# Patient Record
Sex: FEMALE | Race: WHITE | NOT HISPANIC OR LATINO | Employment: OTHER | URBAN - METROPOLITAN AREA
[De-identification: names, ages, dates, MRNs, and addresses within clinical notes are randomized per-mention and may not be internally consistent; named-entity substitution may affect disease eponyms.]

---

## 2018-11-10 ENCOUNTER — HOSPITAL ENCOUNTER (OUTPATIENT)
Facility: MEDICAL CENTER | Age: 28
End: 2018-11-12
Attending: EMERGENCY MEDICINE | Admitting: HOSPITALIST
Payer: MEDICARE

## 2018-11-10 DIAGNOSIS — Q79.60 EHLERS-DANLOS SYNDROME: ICD-10-CM

## 2018-11-10 DIAGNOSIS — H53.9 VISUAL DISTURBANCE: ICD-10-CM

## 2018-11-10 PROBLEM — H53.8 BLURRED VISION, BILATERAL: Status: ACTIVE | Noted: 2018-11-10

## 2018-11-10 PROBLEM — R11.2 NAUSEA AND VOMITING: Status: ACTIVE | Noted: 2018-11-10

## 2018-11-10 PROBLEM — F31.9 BIPOLAR 1 DISORDER (HCC): Status: ACTIVE | Noted: 2018-11-10

## 2018-11-10 PROBLEM — E03.9 HYPOTHYROIDISM: Status: ACTIVE | Noted: 2018-11-10

## 2018-11-10 PROBLEM — G43.909 MIGRAINES: Status: ACTIVE | Noted: 2018-11-10

## 2018-11-10 PROCEDURE — G0378 HOSPITAL OBSERVATION PER HR: HCPCS

## 2018-11-10 PROCEDURE — 99220 PR INITIAL OBSERVATION CARE,LEVL III: CPT | Performed by: HOSPITALIST

## 2018-11-10 PROCEDURE — 99285 EMERGENCY DEPT VISIT HI MDM: CPT

## 2018-11-10 PROCEDURE — 700102 HCHG RX REV CODE 250 W/ 637 OVERRIDE(OP): Performed by: HOSPITALIST

## 2018-11-10 PROCEDURE — A9270 NON-COVERED ITEM OR SERVICE: HCPCS | Performed by: HOSPITALIST

## 2018-11-10 PROCEDURE — 306588 SLEEVE,VASO CALF MED: Performed by: HOSPITALIST

## 2018-11-10 RX ORDER — FAMOTIDINE 20 MG/1
20 TABLET, FILM COATED ORAL DAILY
Status: DISCONTINUED | OUTPATIENT
Start: 2018-11-11 | End: 2018-11-12 | Stop reason: HOSPADM

## 2018-11-10 RX ORDER — PRAMIPEXOLE DIHYDROCHLORIDE 0.5 MG/1
0.5 TABLET ORAL EVERY EVENING
COMMUNITY

## 2018-11-10 RX ORDER — PROMETHAZINE HYDROCHLORIDE 25 MG/1
12.5-25 TABLET ORAL EVERY 4 HOURS PRN
Status: DISCONTINUED | OUTPATIENT
Start: 2018-11-10 | End: 2018-11-12 | Stop reason: HOSPADM

## 2018-11-10 RX ORDER — OMEPRAZOLE 20 MG/1
20 CAPSULE, DELAYED RELEASE ORAL EVERY MORNING
Status: DISCONTINUED | OUTPATIENT
Start: 2018-11-11 | End: 2018-11-12 | Stop reason: HOSPADM

## 2018-11-10 RX ORDER — NORETHINDRONE ACETATE AND ETHINYL ESTRADIOL .03; 1.5 MG/1; MG/1
1 TABLET ORAL EVERY MORNING
COMMUNITY

## 2018-11-10 RX ORDER — ONDANSETRON 4 MG/1
4 TABLET, ORALLY DISINTEGRATING ORAL EVERY 6 HOURS PRN
COMMUNITY

## 2018-11-10 RX ORDER — BUTALBITAL, ACETAMINOPHEN AND CAFFEINE 50; 325; 40 MG/1; MG/1; MG/1
1 TABLET ORAL EVERY 4 HOURS PRN
COMMUNITY

## 2018-11-10 RX ORDER — ALBUTEROL SULFATE 2.5 MG/3ML
2.5 SOLUTION RESPIRATORY (INHALATION) EVERY 4 HOURS PRN
COMMUNITY

## 2018-11-10 RX ORDER — CARBAMAZEPINE 200 MG/1
400 TABLET ORAL 2 TIMES DAILY
Status: DISCONTINUED | OUTPATIENT
Start: 2018-11-10 | End: 2018-11-12 | Stop reason: HOSPADM

## 2018-11-10 RX ORDER — ALPRAZOLAM 0.5 MG/1
0.5 TABLET ORAL 2 TIMES DAILY
Status: DISCONTINUED | OUTPATIENT
Start: 2018-11-10 | End: 2018-11-12 | Stop reason: HOSPADM

## 2018-11-10 RX ORDER — ONDANSETRON 4 MG/1
4 TABLET, ORALLY DISINTEGRATING ORAL EVERY 4 HOURS PRN
Status: DISCONTINUED | OUTPATIENT
Start: 2018-11-10 | End: 2018-11-12 | Stop reason: HOSPADM

## 2018-11-10 RX ORDER — TRAMADOL HYDROCHLORIDE 50 MG/1
50 TABLET ORAL EVERY 6 HOURS PRN
Status: DISCONTINUED | OUTPATIENT
Start: 2018-11-10 | End: 2018-11-12 | Stop reason: HOSPADM

## 2018-11-10 RX ORDER — OMEPRAZOLE 20 MG/1
20 CAPSULE, DELAYED RELEASE ORAL EVERY MORNING
COMMUNITY

## 2018-11-10 RX ORDER — TRAMADOL HYDROCHLORIDE 50 MG/1
50 TABLET ORAL EVERY 6 HOURS PRN
COMMUNITY

## 2018-11-10 RX ORDER — BISACODYL 10 MG
10 SUPPOSITORY, RECTAL RECTAL
Status: DISCONTINUED | OUTPATIENT
Start: 2018-11-10 | End: 2018-11-12 | Stop reason: HOSPADM

## 2018-11-10 RX ORDER — ALPRAZOLAM 0.5 MG/1
0.5 TABLET ORAL 2 TIMES DAILY
COMMUNITY

## 2018-11-10 RX ORDER — LEVOTHYROXINE SODIUM 0.12 MG/1
125 TABLET ORAL
Status: DISCONTINUED | OUTPATIENT
Start: 2018-11-11 | End: 2018-11-12 | Stop reason: HOSPADM

## 2018-11-10 RX ORDER — BUTALBITAL, ACETAMINOPHEN AND CAFFEINE 50; 325; 40 MG/1; MG/1; MG/1
1 TABLET ORAL EVERY 4 HOURS PRN
Status: DISCONTINUED | OUTPATIENT
Start: 2018-11-10 | End: 2018-11-12 | Stop reason: HOSPADM

## 2018-11-10 RX ORDER — PRAMIPEXOLE DIHYDROCHLORIDE 0.5 MG/1
0.5 TABLET ORAL EVERY EVENING
Status: DISCONTINUED | OUTPATIENT
Start: 2018-11-10 | End: 2018-11-12 | Stop reason: HOSPADM

## 2018-11-10 RX ORDER — LEVOTHYROXINE SODIUM 0.12 MG/1
125 TABLET ORAL
COMMUNITY

## 2018-11-10 RX ORDER — TIZANIDINE 4 MG/1
4 TABLET ORAL EVERY 6 HOURS PRN
Status: DISCONTINUED | OUTPATIENT
Start: 2018-11-10 | End: 2018-11-12 | Stop reason: HOSPADM

## 2018-11-10 RX ORDER — FENTANYL 50 UG/1
1 PATCH TRANSDERMAL
COMMUNITY

## 2018-11-10 RX ORDER — MELOXICAM 15 MG/1
15 TABLET ORAL
COMMUNITY

## 2018-11-10 RX ORDER — POLYETHYLENE GLYCOL 3350 17 G/17G
1 POWDER, FOR SOLUTION ORAL
Status: DISCONTINUED | OUTPATIENT
Start: 2018-11-10 | End: 2018-11-12 | Stop reason: HOSPADM

## 2018-11-10 RX ORDER — VENLAFAXINE HYDROCHLORIDE 75 MG/1
150 CAPSULE, EXTENDED RELEASE ORAL EVERY EVENING
Status: DISCONTINUED | OUTPATIENT
Start: 2018-11-10 | End: 2018-11-12 | Stop reason: HOSPADM

## 2018-11-10 RX ORDER — EPINEPHRINE 0.3 MG/.3ML
0.3 INJECTION SUBCUTANEOUS ONCE
COMMUNITY

## 2018-11-10 RX ORDER — FLUOCINONIDE 0.5 MG/G
60 OINTMENT TOPICAL EVERY EVENING
COMMUNITY

## 2018-11-10 RX ORDER — PREGABALIN 75 MG/1
75 CAPSULE ORAL
Status: DISCONTINUED | OUTPATIENT
Start: 2018-11-10 | End: 2018-11-12 | Stop reason: HOSPADM

## 2018-11-10 RX ORDER — VENLAFAXINE HYDROCHLORIDE 150 MG/1
150 CAPSULE, EXTENDED RELEASE ORAL EVERY EVENING
COMMUNITY

## 2018-11-10 RX ORDER — TRIAMCINOLONE ACETONIDE 1 MG/G
1 CREAM TOPICAL 2 TIMES DAILY PRN
COMMUNITY

## 2018-11-10 RX ORDER — PREGABALIN 75 MG/1
75 CAPSULE ORAL
COMMUNITY

## 2018-11-10 RX ORDER — RANITIDINE 300 MG/1
300 TABLET ORAL SEE ADMIN INSTRUCTIONS
COMMUNITY

## 2018-11-10 RX ORDER — PROMETHAZINE HYDROCHLORIDE 25 MG/1
12.5-25 SUPPOSITORY RECTAL EVERY 4 HOURS PRN
Status: DISCONTINUED | OUTPATIENT
Start: 2018-11-10 | End: 2018-11-12 | Stop reason: HOSPADM

## 2018-11-10 RX ORDER — CETIRIZINE HYDROCHLORIDE 10 MG/1
10 TABLET ORAL
COMMUNITY

## 2018-11-10 RX ORDER — ALBUTEROL SULFATE 90 UG/1
2 AEROSOL, METERED RESPIRATORY (INHALATION) EVERY 6 HOURS PRN
COMMUNITY

## 2018-11-10 RX ORDER — TIZANIDINE 4 MG/1
4 TABLET ORAL EVERY 6 HOURS PRN
COMMUNITY

## 2018-11-10 RX ORDER — ONDANSETRON 2 MG/ML
4 INJECTION INTRAMUSCULAR; INTRAVENOUS EVERY 4 HOURS PRN
Status: DISCONTINUED | OUTPATIENT
Start: 2018-11-10 | End: 2018-11-12 | Stop reason: HOSPADM

## 2018-11-10 RX ORDER — DIPHENHYDRAMINE HCL 25 MG
25-50 TABLET ORAL
COMMUNITY

## 2018-11-10 RX ORDER — CARBAMAZEPINE 200 MG/1
400 TABLET ORAL 2 TIMES DAILY
COMMUNITY

## 2018-11-10 RX ORDER — AMOXICILLIN 250 MG
2 CAPSULE ORAL 2 TIMES DAILY
Status: DISCONTINUED | OUTPATIENT
Start: 2018-11-10 | End: 2018-11-12 | Stop reason: HOSPADM

## 2018-11-10 RX ADMIN — PRAMIPEXOLE DIHYDROCHLORIDE 0.5 MG: 0.5 TABLET ORAL at 22:08

## 2018-11-10 RX ADMIN — CARBAMAZEPINE 400 MG: 200 TABLET ORAL at 22:07

## 2018-11-10 RX ADMIN — ALPRAZOLAM 0.5 MG: 0.5 TABLET ORAL at 22:06

## 2018-11-10 RX ADMIN — STANDARDIZED SENNA CONCENTRATE AND DOCUSATE SODIUM 2 TABLET: 8.6; 5 TABLET, FILM COATED ORAL at 22:08

## 2018-11-10 RX ADMIN — VENLAFAXINE HYDROCHLORIDE 150 MG: 75 CAPSULE, EXTENDED RELEASE ORAL at 22:08

## 2018-11-10 ASSESSMENT — PAIN SCALES - GENERAL
PAINLEVEL_OUTOF10: 3
PAINLEVEL_OUTOF10: 3
PAINLEVEL_OUTOF10: 0
PAINLEVEL_OUTOF10: 3

## 2018-11-10 ASSESSMENT — ENCOUNTER SYMPTOMS
FEVER: 0
HEARTBURN: 0
WEAKNESS: 0
FOCAL WEAKNESS: 0
HALLUCINATIONS: 0
SHORTNESS OF BREATH: 0
HEMOPTYSIS: 0
DIZZINESS: 1
SPEECH CHANGE: 0
DOUBLE VISION: 0
BLURRED VISION: 1
EYE DISCHARGE: 0
DEPRESSION: 0
ABDOMINAL PAIN: 0
MYALGIAS: 0
PALPITATIONS: 0
BRUISES/BLEEDS EASILY: 0
NAUSEA: 1
FLANK PAIN: 0
CHILLS: 0
HEADACHES: 1
SENSORY CHANGE: 0
COUGH: 0
VOMITING: 1

## 2018-11-10 ASSESSMENT — COPD QUESTIONNAIRES
DURING THE PAST 4 WEEKS HOW MUCH DID YOU FEEL SHORT OF BREATH: SOME OF THE TIME
IN THE PAST 12 MONTHS DO YOU DO LESS THAN YOU USED TO BECAUSE OF YOUR BREATHING PROBLEMS: DISAGREE/UNSURE
HAVE YOU SMOKED AT LEAST 100 CIGARETTES IN YOUR ENTIRE LIFE: NO/DON'T KNOW
DO YOU EVER COUGH UP ANY MUCUS OR PHLEGM?: NO/ONLY WITH OCCASIONAL COLDS OR INFECTIONS
COPD SCREENING SCORE: 1

## 2018-11-10 ASSESSMENT — PATIENT HEALTH QUESTIONNAIRE - PHQ9
SUM OF ALL RESPONSES TO PHQ9 QUESTIONS 1 AND 2: 0
1. LITTLE INTEREST OR PLEASURE IN DOING THINGS: NOT AT ALL
2. FEELING DOWN, DEPRESSED, IRRITABLE, OR HOPELESS: NOT AT ALL

## 2018-11-10 ASSESSMENT — LIFESTYLE VARIABLES
EVER_SMOKED: NEVER
SUBSTANCE_ABUSE: 0
ALCOHOL_USE: NO

## 2018-11-11 ENCOUNTER — APPOINTMENT (OUTPATIENT)
Dept: RADIOLOGY | Facility: MEDICAL CENTER | Age: 28
End: 2018-11-11
Attending: HOSPITALIST
Payer: MEDICARE

## 2018-11-11 LAB
ALBUMIN SERPL BCP-MCNC: 3.5 G/DL (ref 3.2–4.9)
ALBUMIN/GLOB SERPL: 1.2 G/DL
ALP SERPL-CCNC: 69 U/L (ref 30–99)
ALT SERPL-CCNC: 12 U/L (ref 2–50)
ANION GAP SERPL CALC-SCNC: 7 MMOL/L (ref 0–11.9)
AST SERPL-CCNC: 14 U/L (ref 12–45)
BASOPHILS # BLD AUTO: 0.4 % (ref 0–1.8)
BASOPHILS # BLD: 0.02 K/UL (ref 0–0.12)
BILIRUB SERPL-MCNC: 0.3 MG/DL (ref 0.1–1.5)
BUN SERPL-MCNC: 11 MG/DL (ref 8–22)
CALCIUM SERPL-MCNC: 9.1 MG/DL (ref 8.5–10.5)
CHLORIDE SERPL-SCNC: 105 MMOL/L (ref 96–112)
CHOLEST SERPL-MCNC: 164 MG/DL (ref 100–199)
CO2 SERPL-SCNC: 27 MMOL/L (ref 20–33)
CREAT SERPL-MCNC: 0.54 MG/DL (ref 0.5–1.4)
EOSINOPHIL # BLD AUTO: 0.12 K/UL (ref 0–0.51)
EOSINOPHIL NFR BLD: 2.5 % (ref 0–6.9)
ERYTHROCYTE [DISTWIDTH] IN BLOOD BY AUTOMATED COUNT: 41.3 FL (ref 35.9–50)
EST. AVERAGE GLUCOSE BLD GHB EST-MCNC: 123 MG/DL
GLOBULIN SER CALC-MCNC: 3 G/DL (ref 1.9–3.5)
GLUCOSE SERPL-MCNC: 100 MG/DL (ref 65–99)
HBA1C MFR BLD: 5.9 % (ref 0–5.6)
HCT VFR BLD AUTO: 37.8 % (ref 37–47)
HDLC SERPL-MCNC: 48 MG/DL
HGB BLD-MCNC: 12.2 G/DL (ref 12–16)
IMM GRANULOCYTES # BLD AUTO: 0.01 K/UL (ref 0–0.11)
IMM GRANULOCYTES NFR BLD AUTO: 0.2 % (ref 0–0.9)
LDLC SERPL CALC-MCNC: 95 MG/DL
LYMPHOCYTES # BLD AUTO: 2.15 K/UL (ref 1–4.8)
LYMPHOCYTES NFR BLD: 44.2 % (ref 22–41)
MCH RBC QN AUTO: 29.1 PG (ref 27–33)
MCHC RBC AUTO-ENTMCNC: 32.3 G/DL (ref 33.6–35)
MCV RBC AUTO: 90.2 FL (ref 81.4–97.8)
MONOCYTES # BLD AUTO: 0.45 K/UL (ref 0–0.85)
MONOCYTES NFR BLD AUTO: 9.3 % (ref 0–13.4)
NEUTROPHILS # BLD AUTO: 2.11 K/UL (ref 2–7.15)
NEUTROPHILS NFR BLD: 43.4 % (ref 44–72)
NRBC # BLD AUTO: 0 K/UL
NRBC BLD-RTO: 0 /100 WBC
PLATELET # BLD AUTO: 240 K/UL (ref 164–446)
PMV BLD AUTO: 10.8 FL (ref 9–12.9)
POTASSIUM SERPL-SCNC: 3.7 MMOL/L (ref 3.6–5.5)
PROT SERPL-MCNC: 6.5 G/DL (ref 6–8.2)
RBC # BLD AUTO: 4.19 M/UL (ref 4.2–5.4)
SODIUM SERPL-SCNC: 139 MMOL/L (ref 135–145)
TRIGL SERPL-MCNC: 106 MG/DL (ref 0–149)
TSH SERPL DL<=0.005 MIU/L-ACNC: 0.51 UIU/ML (ref 0.38–5.33)
WBC # BLD AUTO: 4.9 K/UL (ref 4.8–10.8)

## 2018-11-11 PROCEDURE — 700102 HCHG RX REV CODE 250 W/ 637 OVERRIDE(OP): Performed by: HOSPITALIST

## 2018-11-11 PROCEDURE — 84443 ASSAY THYROID STIM HORMONE: CPT

## 2018-11-11 PROCEDURE — A9270 NON-COVERED ITEM OR SERVICE: HCPCS | Performed by: HOSPITALIST

## 2018-11-11 PROCEDURE — 70553 MRI BRAIN STEM W/O & W/DYE: CPT

## 2018-11-11 PROCEDURE — A9585 GADOBUTROL INJECTION: HCPCS | Performed by: INTERNAL MEDICINE

## 2018-11-11 PROCEDURE — 80061 LIPID PANEL: CPT

## 2018-11-11 PROCEDURE — 80053 COMPREHEN METABOLIC PANEL: CPT

## 2018-11-11 PROCEDURE — G0378 HOSPITAL OBSERVATION PER HR: HCPCS

## 2018-11-11 PROCEDURE — 99225 PR SUBSEQUENT OBSERVATION CARE,LEVEL II: CPT | Performed by: INTERNAL MEDICINE

## 2018-11-11 PROCEDURE — 85025 COMPLETE CBC W/AUTO DIFF WBC: CPT

## 2018-11-11 PROCEDURE — 83036 HEMOGLOBIN GLYCOSYLATED A1C: CPT

## 2018-11-11 PROCEDURE — 70543 MRI ORBT/FAC/NCK W/O &W/DYE: CPT

## 2018-11-11 PROCEDURE — 700117 HCHG RX CONTRAST REV CODE 255: Performed by: INTERNAL MEDICINE

## 2018-11-11 RX ORDER — GADOBUTROL 604.72 MG/ML
9 INJECTION INTRAVENOUS ONCE
Status: COMPLETED | OUTPATIENT
Start: 2018-11-11 | End: 2018-11-11

## 2018-11-11 RX ADMIN — GADOBUTROL 9 ML: 604.72 INJECTION INTRAVENOUS at 16:28

## 2018-11-11 RX ADMIN — ALPRAZOLAM 0.5 MG: 0.5 TABLET ORAL at 05:59

## 2018-11-11 RX ADMIN — CARBAMAZEPINE 400 MG: 200 TABLET ORAL at 06:00

## 2018-11-11 RX ADMIN — STANDARDIZED SENNA CONCENTRATE AND DOCUSATE SODIUM 2 TABLET: 8.6; 5 TABLET, FILM COATED ORAL at 18:11

## 2018-11-11 RX ADMIN — BUTALBITAL, ACETAMINOPHEN AND CAFFEINE 1 TABLET: 50; 325; 40 TABLET ORAL at 08:05

## 2018-11-11 RX ADMIN — FAMOTIDINE 20 MG: 20 TABLET, FILM COATED ORAL at 06:00

## 2018-11-11 RX ADMIN — TRAMADOL HYDROCHLORIDE 50 MG: 50 TABLET, FILM COATED ORAL at 16:55

## 2018-11-11 RX ADMIN — VENLAFAXINE HYDROCHLORIDE 150 MG: 75 CAPSULE, EXTENDED RELEASE ORAL at 18:12

## 2018-11-11 RX ADMIN — CARBAMAZEPINE 400 MG: 200 TABLET ORAL at 18:12

## 2018-11-11 RX ADMIN — PRAMIPEXOLE DIHYDROCHLORIDE 0.5 MG: 0.5 TABLET ORAL at 18:12

## 2018-11-11 RX ADMIN — OMEPRAZOLE 20 MG: 20 CAPSULE, DELAYED RELEASE ORAL at 06:00

## 2018-11-11 RX ADMIN — ALPRAZOLAM 0.5 MG: 0.5 TABLET ORAL at 18:11

## 2018-11-11 RX ADMIN — LEVOTHYROXINE SODIUM 125 MCG: 0.12 TABLET ORAL at 06:00

## 2018-11-11 RX ADMIN — STANDARDIZED SENNA CONCENTRATE AND DOCUSATE SODIUM 2 TABLET: 8.6; 5 TABLET, FILM COATED ORAL at 06:01

## 2018-11-11 ASSESSMENT — ENCOUNTER SYMPTOMS
VOMITING: 0
FEVER: 0
CONSTIPATION: 0
ABDOMINAL PAIN: 0
PALPITATIONS: 0
ORTHOPNEA: 0
BLURRED VISION: 1
NAUSEA: 1
EYE DISCHARGE: 0
BRUISES/BLEEDS EASILY: 0
BACK PAIN: 1
SPEECH CHANGE: 0
EYE REDNESS: 0
DIAPHORESIS: 0
PND: 0
COUGH: 0
SENSORY CHANGE: 0
EYE PAIN: 0
SINUS PAIN: 0
INSOMNIA: 0
BLOOD IN STOOL: 0
FOCAL WEAKNESS: 0
DIZZINESS: 1
DIARRHEA: 1
NERVOUS/ANXIOUS: 0
WHEEZING: 0
DEPRESSION: 0
CHILLS: 0
SHORTNESS OF BREATH: 0
WEAKNESS: 0
HEADACHES: 1

## 2018-11-11 ASSESSMENT — PAIN SCALES - GENERAL
PAINLEVEL_OUTOF10: 7
PAINLEVEL_OUTOF10: 4

## 2018-11-11 NOTE — DISCHARGE SUMMARY
Discharge Summary    CHIEF COMPLAINT ON ADMISSION  Chief Complaint   Patient presents with   • Blurred Vision     eyes dilated last wednesday ,trf from [facility]   • Head Ache     Reason for Admission  Blurry vision    Admission Date  11/10/2018    CODE STATUS  Full Code    HPI & HOSPITAL COURSE  This is a 28 y.o. female here with bilateral blurry vision that occurred 3 days ago after having her eyes dilated by her eye doctor. It was accompanied by nausea/vomiting, dizziness, and loss of balance. Lab workup was done and was normal. She was evaluated by opthalmology who recommended a MRI or her brain and orbits to rule out optic neuritis, and those were noted to be normal. Her vital signs have been within the normal range and she is being discharged today and already has a scheduled appointment with Dr. Welsh today at 13:30.    Therefore, she is discharged in good and stable condition to home with close outpatient follow-up.    Discharge Date  11/12/18    FOLLOW UP ITEMS POST DISCHARGE  PCP within 1-2 weeks  Opthalmology at first available    DISCHARGE DIAGNOSES  Principal Problem:    Blurred vision, bilateral POA: Yes  Active Problems:    Kylah-Danlos syndrome POA: Yes    Bipolar 1 disorder (HCC) POA: Yes    Hypothyroidism POA: Yes    Nausea and vomiting POA: Yes    Migraines POA: Yes  Resolved Problems:    * No resolved hospital problems. *    FOLLOW UP  No future appointments.  No follow-up provider specified.    MEDICATIONS ON DISCHARGE     Medication List      CONTINUE taking these medications      Instructions   acetaminophen/caffeine/butalbital 325-40-50 mg -40 MG Tabs  Commonly known as:  FIORICET   Take 1 Tab by mouth every four hours as needed for Headache or Migraine.  Dose:  1 Tab     * albuterol 2.5mg/3ml Nebu solution for nebulization  Commonly known as:  PROVENTIL   2.5 mg by Nebulization route every four hours as needed for Shortness of Breath (allergic reactions).  Dose:   2.5 mg     * albuterol 108 (90 Base) MCG/ACT Aers inhalation aerosol   Inhale 2 Puffs by mouth every 6 hours as needed for Shortness of Breath.  Dose:  2 Puff     ALPRAZolam 0.5 MG Tabs  Commonly known as:  XANAX   Take 0.5 mg by mouth 2 Times a Day.  Dose:  0.5 mg     carBAMazepine 200 MG Tabs  Commonly known as:  TEGRETOL   Take 400 mg by mouth 2 Times a Day.  Dose:  400 mg     cetirizine 10 MG Tabs  Commonly known as:  ZYRTEC   Take 10 mg by mouth 1 time daily as needed for Allergies.  Dose:  10 mg     diphenhydrAMINE 25 MG Tabs  Commonly known as:  BENADRYL   Take 25-50 mg by mouth 1 time daily as needed for Sleep (allergic reactions).  Dose:  25-50 mg     EFFEXOR  MG extended-release capsule  Generic drug:  venlafaxine   Take 150 mg by mouth every evening.  Dose:  150 mg     EPIPEN 2-HARSHAD 0.3 MG/0.3ML Soaj solution for injection  Generic drug:  EPINEPHrine   0.3 mg by Intramuscular route Once.  Dose:  0.3 mg     fentaNYL 50 MCG/HR Pt72  Commonly known as:  DURAGESIC   Apply 1 Patch to skin as directed every 36 hours.  Dose:  1 Patch     fluocinonide 0.05 % Oint  Commonly known as:  LIDEX   Apply 60 g to affected area(s) every evening.  Dose:  60 g     ipratropium 0.02 % Soln  Commonly known as:  ATROVENT   0.2 mg by Nebulization route as needed (allergic reactions).  Dose:  0.2 mg     levothyroxine 125 MCG Tabs  Commonly known as:  SYNTHROID   Take 125 mcg by mouth Every morning on an empty stomach.  Dose:  125 mcg     meloxicam 15 MG tablet  Commonly known as:  MOBIC   Take 15 mg by mouth 1 time daily as needed for Inflammation.  Dose:  15 mg     MICROGESTIN 1.5-30 MG-MCG Tabs  Generic drug:  Norethindrone Acet-Ethinyl Est   Take 1 Tab by mouth every morning. Skip placebo  Dose:  1 Tab     multivitamin Tabs   Take 1 Tab by mouth every morning.  Dose:  1 Tab     omeprazole 20 MG delayed-release capsule  Commonly known as:  PRILOSEC   Take 20 mg by mouth every morning.  Dose:  20 mg     ondansetron 4 MG  Tbdp  Commonly known as:  ZOFRAN ODT   Take 4 mg by mouth every 6 hours as needed for Nausea.  Dose:  4 mg     pramipexole 0.5 MG Tabs  Commonly known as:  MIRAPEX   Take 0.5 mg by mouth every evening.  Dose:  0.5 mg     pregabalin 75 MG Caps  Commonly known as:  LYRICA   Take 75 mg by mouth 1 time daily as needed (restless leg syndrome).  Dose:  75 mg     ranitidine 300 MG tablet  Commonly known as:  ZANTAC   Take 300 mg by mouth See Admin Instructions. QD week on week off. Week off starting 11/10/18  Dose:  300 mg     tizanidine 4 MG Tabs  Commonly known as:  ZANAFLEX   Take 4 mg by mouth every 6 hours as needed (muscle spasms).  Dose:  4 mg     tramadol 50 MG Tabs  Commonly known as:  ULTRAM   Take 50 mg by mouth every 6 hours as needed for Moderate Pain.  Dose:  50 mg     triamcinolone acetonide 0.1 % Crea  Commonly known as:  KENALOG   Apply 1 Application to affected area(s) 2 times a day as needed (itching).  Dose:  1 Application        * This list has 2 medication(s) that are the same as other medications prescribed for you. Read the directions carefully, and ask your doctor or other care provider to review them with you.              Allergies  Allergies   Allergen Reactions   • Contrast Media With Iodine [Iodine]      rash   • Food      Red meat, coconut and byproduct , walnuts, palm and byproduct   • Klonopin [Clonazepam]    • Tape      rash     DIET  Orders Placed This Encounter   Procedures   • Diet Order Regular     Standing Status:   Standing     Number of Occurrences:   1     Order Specific Question:   Diet:     Answer:   Regular [1]     ACTIVITY  As tolerated.  Exercise encouraged.    CONSULTATIONS  Opthalmology    PROCEDURES  None    LABORATORY  Lab Results   Component Value Date    SODIUM 139 11/11/2018    POTASSIUM 3.7 11/11/2018    CHLORIDE 105 11/11/2018    CO2 27 11/11/2018    GLUCOSE 100 (H) 11/11/2018    BUN 11 11/11/2018    CREATININE 0.54 11/11/2018      Lab Results   Component Value Date     WBC 4.9 11/11/2018    HEMOGLOBIN 12.2 11/11/2018    HEMATOCRIT 37.8 11/11/2018    PLATELETCT 240 11/11/2018      Total time of the discharge process exceeds 36 minutes.

## 2018-11-11 NOTE — ED PROVIDER NOTES
ED Provider Note    HPI: Patient is a 28-year-old female who presented to the emergency department November 10, 2018 at 4:32 PM with a chief complaint of visual disturbance.    Patient has a history of Erler's Danlos syndrome and states she frequently has prolonged visual disturbances after she has her eyes dilated.  She had her eyes dilated by an optometrist on Wednesday and has had visual disturbance since that time but this is different from previous episodes.  She states in her left eye now she essentially has ability to see light but not much else in the right eye she has occasional wavy disturbance in her vision and some abnormal appearance of color.  No trauma.  She presented to another facility where laboratory studies were unrevealing.  They did not have ophthalmology available and they felt the patient should be seen here for further evaluation.  Patient elected to come by private car.  The patient denies any trauma.  She has not had a fever chills or cough.  She has no other neurologic complaints, no weakness numbness tingling or difficulty with ambulation.  No other somatic complain    Review of Systems: Positive for visual disturbance in both eyes negative for fever chills cough weakness numbness tingling difficulty with ambulation.  Review of systems reviewed with patient, all other systems negative    Past medical/surgical history: Back spasms or lows Danlos syndrome epigastric pain eczema sciatica reflux abdominal migraine bipolar disorder hypothyroid    Medications: Diphenhydramine alprazolam butalbital/acetaminophen cetirizine ipratropium/albuterol levothyroxine Atrovent Imitrex bupropion pramipexole ketorolac omeprazole fentanyl patch venlafaxineTegretol sumatriptan benztropine    Allergies: Iodine contrast Klonopin    Social History: No history of smoking no alcohol use      Physical exam: Constitutional: Pleasant female awake alert appeared slightly anxious  Vital signs: Temperature 96.9 pulse  113 respirations 16 blood pressure 114/85 pulse oximetry 94%  EYES: PERRL, EOMI, Conjunctivae and sclera normal, eyelids normal bilaterally.  Right pupil appears slightly larger than the left.  Neck: Trachea midline. No cervical masses seen or palpated. Normal range of motion, supple. No meningeal signs elicited.  Cardiac: Regular rate and rhythm. S1-S2 present. No S3 or S4 present. No murmurs, rubs, or gallops heard. No edema or varicosities were seen.   Lungs: Clear to auscultation with good aeration throughout. No wheezes, rales, or rhonchi heard. Patient's chest wall moved symmetrically with each respiratory effort. Patient was not making use of accessory muscles of respiration in breathing.  Abdomen: Soft nontender to palpation. No rebound or guarding elicited. No organomegaly identified. No pulsatile abdominal masses identified.   Musculoskeletal:  no  pain with palpitation or movement of muscle, bone or joint , no obvious musculoskeletal deformities identified.  Neurologic: alert and awake answers questions appropriately. Moves all four extremities independently, no gross focal abnormalities identified. Normal strength and motor.  Skin: no rash or lesion seen, no palpable dermatologic lesions identified.  Psychiatric: Patient appear to be slightly anxious.  She was not delusional or loose    Medical decision making: I reviewed the studies from the other facility.  Patient has normal coagulation studies normal chemistry panel and an unremarkable CBC.    Dr. Welsh was consulted for ophthalmology.  He was kind enough to come to the emergency department and evaluate the patient.  Please see his note.  He said he saw no obvious structural abnormality.  He did say he thought optic neuritis is on the differential diagnosis and recommended further imaging studies be obtained.    Patient will be admitted by hospitalist service for further evaluation.  If this ends up being some sort of cerebrovascular event she  presents well outside any sort of window for intervention such as lytic or retrieval.  Other possibilities could represent optic neuritis cavernous sinus thrombosis or cerebritis.    Further care and hospital course per attending physician summary    Present visual disturbance  2) Erler Danlos syndrome

## 2018-11-11 NOTE — PROGRESS NOTES
Assessment completed. Pt A&Ox4. Respirations are even and unlabored on RA. Pt reports chronic back pain at this time. States headache is still present, 7/10. Reports halo to right eye vision, L vision worse than right eye. Up to commode with SBA. Monitors applied, VS stable, call light and belongings within reach. POC updated (MRI pending). Pt educated on room and call light, pt verbalized understanding. Communication board updated. Needs met.

## 2018-11-11 NOTE — ED TRIAGE NOTES
Chief Complaint   Patient presents with   • Blurred Vision     eyes dilated last wednesday ,trf from [facility]   • Head Ache     Pt ambulated to triage , trf from [facility] , per pt she had her eyes dilated last Wednesday and since then her vision has been getting worst L>R.   Informed charge rn pt's arrival. Pt arrived with right hand piv.   Placed pt on senior lounge with her parents.

## 2018-11-11 NOTE — CONSULTS
S: 27 yo woman reports progressive vision loss OS>>OD since an optometric exam 4 days ago. She says she takes a long time to recover from dilating drops, but that her vision has never been impaired before. She also reports photophobia OS and pain on eye movement OS. Past ocular history is remarkable for high myopia, corrected with spectacles or contact lenses. PMH is remarkable for Kylah-Danlos.     O: VAcc 20/40 OD, 20/100 OS  Motility full, VF full OD, general constriction OS. PERRL, no APD, subjective color desaturation OS.  Slit lamp: Normal anterior segment. Lens clear, no phacodonesis.  Fundus: normal optic nerve, macula, and periphery OU.    Assessment:  1. Visual loss OS, uncertain etiology  DDX includes optic neuritis, pharmacologic effect from dilation (unlikely given worsening vision each day), lens instability given her connective tissue disorder (not evident on slit lamp exam)  2. High myopia    Plan:  MRI of brain, orbits with and without contrast to exclude CNS or optic nerve pathology  F/U ophthalmology clinic for comprehensive eye exam to include refraction, threshold visual fields, OCT of optic nerve and macula.

## 2018-11-11 NOTE — ASSESSMENT & PLAN NOTE
Optho consulted, recommended MRI of the brain & orbits, which is pending.  Optic neuritis vs lens dislocation vs possible medication side effects.  Has a tentative optho appt tomorrow with Dr. Welsh.

## 2018-11-11 NOTE — ED NOTES
Med rec completed per pt at bedside with detailed medication list  Copied list and returned to pt  Allergies reviewed  No ABX in last 30 days

## 2018-11-11 NOTE — H&P
Hospital Medicine History & Physical Note    Date of Service  11/10/2018    Primary Care Physician  No primary care provider on file.    Consultants  optho    Code Status  full    Chief Complaint  Blurred vision    History of Presenting Illness  28 y.o. female who presented 11/10/2018 with history of Kylah-Danlos syndrome who presents with blurred vision bilaterally. she was going to get fitted for contact lenses about 3 days ago where she had her eyes dilated.  She has had worsening visual disturbances bilaterally since Wednesday.  sHe has no known alleviating or to her symptoms.She is also had associated nausea vomiting and headaches with dizziness and loss of balance Optho consulted in the Er recommend admission to hospital with MRI brain, and orbits. Neuro consult in am.       Review of Systems  Review of Systems   Constitutional: Negative for chills and fever.   HENT: Negative for congestion, hearing loss and tinnitus.    Eyes: Positive for blurred vision. Negative for double vision and discharge.   Respiratory: Negative for cough, hemoptysis and shortness of breath.    Cardiovascular: Negative for chest pain, palpitations and leg swelling.   Gastrointestinal: Positive for nausea and vomiting. Negative for abdominal pain and heartburn.   Genitourinary: Negative for dysuria and flank pain.   Musculoskeletal: Negative for joint pain and myalgias.   Skin: Negative for rash.   Neurological: Positive for dizziness and headaches. Negative for sensory change, speech change, focal weakness and weakness.   Endo/Heme/Allergies: Negative for environmental allergies. Does not bruise/bleed easily.   Psychiatric/Behavioral: Negative for depression, hallucinations and substance abuse.       Past Medical History   has a past medical history of Arthritis; Asthma; Bipolar 1 disorder (HCC); Eczema; Kylah-Danlos syndrome; Hypothyroidism; Migraine; Nausea & vomiting; Seizure (HCC); TMJ (dislocation of temporomandibular joint);  Urticaria; and Vocal cord dysfunction.    Surgical History   has a past surgical history that includes thyroidectomy; myringotomy; and shoulder arthroscopy w/ slap / labral repair.     Family History  Reviewed and noncontributory    Social History   reports that she has never smoked. She has never used smokeless tobacco. She reports that she does not drink alcohol or use drugs.    Allergies  Allergies   Allergen Reactions   • Contrast Media With Iodine [Iodine]      rash   • Food      Red meat, coconut and byproduct , walnuts, palm and byproduct   • Klonopin [Clonazepam]    • Tape      rash       Medications  None       Physical Exam  Temp:  [36.1 °C (96.9 °F)] 36.1 °C (96.9 °F)  Pulse:  [113] 113  Resp:  [16] 16  BP: (114)/(85) 114/85    Physical Exam   Constitutional: She is oriented to person, place, and time. She appears well-developed and well-nourished. No distress.   HENT:   Head: Normocephalic and atraumatic.   Eyes: Pupils are equal, round, and reactive to light. Conjunctivae are normal.   Neck: Normal range of motion. Neck supple. No JVD present.   Cardiovascular: Normal rate, regular rhythm, normal heart sounds and intact distal pulses.    No murmur heard.  Pulmonary/Chest: Effort normal and breath sounds normal. No respiratory distress. She has no wheezes.   Abdominal: Soft. Bowel sounds are normal. She exhibits no distension. There is no tenderness.   Musculoskeletal: Normal range of motion. She exhibits no edema.   Neurological: She is alert and oriented to person, place, and time. She exhibits normal muscle tone.   Skin: Skin is warm and dry.   Psychiatric: She has a normal mood and affect. Her behavior is normal. Judgment and thought content normal.   Nursing note and vitals reviewed.      Laboratory:          No results for input(s): ALTSGPT, ASTSGOT, ALKPHOSPHAT, TBILIRUBIN, DBILIRUBIN, GAMMAGT, AMYLASE, LIPASE, ALB, PREALBUMIN, GLUCOSE in the last 72 hours.              No results for input(s):  TROPONINI in the last 72 hours.    Urinalysis:    No results found     Imaging:  MR-ORBITS,FACE,NECK-WITH&W/O & SEQUENCES    (Results Pending)   MR-BRAIN-WITH & W/O    (Results Pending)         Assessment/Plan:  I anticipate this patient is appropriate for observation status at this time.    * Blurred vision, bilateral   Assessment & Plan    Optho consulted   Concern for optic neuritis, lens dislocation and possible medication side effects  Repeat labs ordered, does no seem to be infectious etiology   MRI brain and Mri orbits have been ordered  Neuro consult in am   Needs outpatient follow up with optho if workup unrevealing     Migraines   Assessment & Plan    Symptomatic management   Cont home medications   Pain control   fiorocet     Nausea and vomiting   Assessment & Plan    Anti emetics     Hypothyroidism   Assessment & Plan    Resume levothyroxine   Recheck tsh     Bipolar 1 disorder (HCC)   Assessment & Plan    Resume home mood stabilizers     Kylah-Danlos syndrome   Assessment & Plan    Hx of with multiple complications   Continue home medications         VTE prophylaxis: heparin

## 2018-11-11 NOTE — ED NOTES
Hourly rounding performed. Assessed patient complaints and Bathroom/comfort needs.    Introduced self to patient. Patient is resting comfortably.

## 2018-11-11 NOTE — PROGRESS NOTES
Patient received via transport by W/C. Patient put into room T214. VS taken and stable. Patient state's she has Chronic back pain and headache 3/10. Patient alert and oriented.

## 2018-11-12 ENCOUNTER — PATIENT OUTREACH (OUTPATIENT)
Dept: HEALTH INFORMATION MANAGEMENT | Facility: OTHER | Age: 28
End: 2018-11-12

## 2018-11-12 VITALS
DIASTOLIC BLOOD PRESSURE: 63 MMHG | HEIGHT: 72 IN | OXYGEN SATURATION: 97 % | BODY MASS INDEX: 27.65 KG/M2 | TEMPERATURE: 96.8 F | RESPIRATION RATE: 18 BRPM | SYSTOLIC BLOOD PRESSURE: 114 MMHG | WEIGHT: 204.15 LBS | HEART RATE: 74 BPM

## 2018-11-12 PROCEDURE — A9270 NON-COVERED ITEM OR SERVICE: HCPCS | Performed by: HOSPITALIST

## 2018-11-12 PROCEDURE — G0378 HOSPITAL OBSERVATION PER HR: HCPCS

## 2018-11-12 PROCEDURE — 99217 PR OBSERVATION CARE DISCHARGE: CPT | Performed by: INTERNAL MEDICINE

## 2018-11-12 PROCEDURE — 700102 HCHG RX REV CODE 250 W/ 637 OVERRIDE(OP): Performed by: HOSPITALIST

## 2018-11-12 RX ADMIN — CARBAMAZEPINE 400 MG: 200 TABLET ORAL at 05:47

## 2018-11-12 RX ADMIN — FAMOTIDINE 20 MG: 20 TABLET, FILM COATED ORAL at 05:47

## 2018-11-12 RX ADMIN — OMEPRAZOLE 20 MG: 20 CAPSULE, DELAYED RELEASE ORAL at 05:48

## 2018-11-12 RX ADMIN — ALPRAZOLAM 0.5 MG: 0.5 TABLET ORAL at 05:47

## 2018-11-12 RX ADMIN — BUTALBITAL, ACETAMINOPHEN AND CAFFEINE 1 TABLET: 50; 325; 40 TABLET ORAL at 08:27

## 2018-11-12 RX ADMIN — LEVOTHYROXINE SODIUM 125 MCG: 0.12 TABLET ORAL at 05:47

## 2018-11-12 ASSESSMENT — PAIN SCALES - GENERAL
PAINLEVEL_OUTOF10: 3
PAINLEVEL_OUTOF10: 3
PAINLEVEL_OUTOF10: 7

## 2018-11-12 NOTE — PROGRESS NOTES
IV removed. Discharge instructions provided and pt verbalizes understanding. Pt states that all question have been answered. Copy of discharge provided to pt and signed. Pt states that all personal items are in possess. Pt escorted off unit by RN with family without incident via wheelchair.

## 2018-11-12 NOTE — PROGRESS NOTES
Rounded on pt. Pt resting comfortably and appears to be sleeping, equal chest rise and fall noted.

## 2018-11-12 NOTE — PROGRESS NOTES
"[facility] Medicine Daily Progress Note    Date of Service  11/11/2018    Chief Complaint  Blurry vision    Hospital Course   28 y.o. female admitted 11/10/2018 with bilateral blurry vision that occurred 3 days ago after having her eyes dilated by her eye doctor. It was accompanied by nausea/vomiting, dizziness, and loss of balance. Lab workup was done and was normal. She was evaluated by opthalmology who recommended a MRI or her brain and orbits to rule out optic neuritis, which are pending.     Interval Problem Update  Not feeling much better.  Only sees \"blur\" out of her left eye, can differentiate colors & shapes with her right eye. Sees a \"\"kaleidoscope\" when it's completely dark.  Had 1 episode of diarrhea today. Denies abdominal pain but states she has been more constipated than usual recently.  Has mild nausea after eating. Also complains of a mild headache.  Vital signs are stable.    Consultants/Specialty  Opthalmology    Code Status  Full Code    Disposition  Monitor overnight.    Review of Systems  Review of Systems   Constitutional: Negative for chills, diaphoresis, fever and malaise/fatigue.   HENT: Negative for congestion, nosebleeds and sinus pain.    Eyes: Positive for blurred vision. Negative for pain, discharge and redness.   Respiratory: Negative for cough, shortness of breath and wheezing.    Cardiovascular: Negative for chest pain, palpitations, orthopnea, leg swelling and PND.   Gastrointestinal: Positive for diarrhea and nausea (after eating). Negative for abdominal pain, blood in stool, constipation, melena and vomiting.   Genitourinary: Negative for dysuria, frequency, hematuria and urgency.   Musculoskeletal: Positive for back pain (\"from laying on the MRI table for an hour\").   Neurological: Positive for dizziness and headaches. Negative for sensory change, speech change, focal weakness and weakness.   Endo/Heme/Allergies: Does not bruise/bleed easily.   Psychiatric/Behavioral: Negative for " depression. The patient is not nervous/anxious and does not have insomnia.    All other systems reviewed and are negative.     Physical Exam  Temp:  [35.9 °C (96.6 °F)-36.7 °C (98.1 °F)] 36 °C (96.8 °F)  Pulse:  [72-94] 73  Resp:  [14-19] 18  BP: ()/(54-84) 111/70    Physical Exam   Constitutional: She is oriented to person, place, and time. She appears well-developed and well-nourished. She is active and cooperative. No distress.   HENT:   Head: Normocephalic and atraumatic.   Mouth/Throat: Oropharynx is clear and moist.   Eyes: Right eye exhibits no discharge. Left eye exhibits no discharge. No scleral icterus.   Neck: Normal range of motion. Neck supple. No JVD present.   Cardiovascular: Normal rate, regular rhythm, normal heart sounds and intact distal pulses.  Exam reveals no gallop and no friction rub.    No murmur heard.  Pulmonary/Chest: Effort normal. No stridor. No respiratory distress. She has no decreased breath sounds. She has no wheezes. She has no rhonchi. She has no rales.   Abdominal: Soft. Bowel sounds are normal. She exhibits no distension. There is no tenderness. There is no rebound and no guarding.   Musculoskeletal: Normal range of motion. She exhibits no edema.   Neurological: She is alert and oriented to person, place, and time. GCS eye subscore is 4. GCS verbal subscore is 5. GCS motor subscore is 6.   Skin: Skin is warm and dry. No rash noted. She is not diaphoretic. No erythema. No pallor.   Psychiatric: She has a normal mood and affect. Her speech is normal and behavior is normal. Judgment and thought content normal. Cognition and memory are normal.     Fluids  No intake or output data in the 24 hours ending 11/11/18 1803    Laboratory  Recent Labs      11/11/18   0517   WBC  4.9   RBC  4.19*   HEMOGLOBIN  12.2   HEMATOCRIT  37.8   MCV  90.2   MCH  29.1   MCHC  32.3*   RDW  41.3   PLATELETCT  240   MPV  10.8     Recent Labs      11/11/18   0517   SODIUM  139   POTASSIUM  3.7    CHLORIDE  105   CO2  27   GLUCOSE  100*   BUN  11   CREATININE  0.54   CALCIUM  9.1     Recent Labs      11/11/18   0517   TRIGLYCERIDE  106   HDL  48   LDL  95     Imaging  MR-ORBITS,FACE,NECK-WITH&W/O & SEQUENCES    (Results Pending)   MR-BRAIN-WITH & W/O    (Results Pending)      Assessment/Plan  * Blurred vision, bilateral- (present on admission)   Assessment & Plan    Optho consulted, recommended MRI of the brain & orbits, which is pending.  Optic neuritis vs lens dislocation vs possible medication side effects.  Has a tentative optho appt tomorrow with Dr. Welsh.     Kylah-Danlos syndrome- (present on admission)   Assessment & Plan    Hx of with multiple complications .  Continue home medications.     Migraines- (present on admission)   Assessment & Plan    Symptomatic management.   Continue home medications.  Pain control.     Nausea and vomiting- (present on admission)   Assessment & Plan    Only mild nausea now, after eating.  No vomiting.     Hypothyroidism- (present on admission)   Assessment & Plan    Resume levothyroxine.  TSH normal.     Bipolar 1 disorder (HCC)- (present on admission)   Assessment & Plan    Resume home effexor & xanax.        VTE prophylaxis: Ambulatory

## 2018-11-12 NOTE — DISCHARGE INSTRUCTIONS
Discharge Instructions    Discharged to home by car with relative. Discharged via wheelchair, hospital escort: Yes.  Special equipment needed: Not Applicable    Be sure to schedule a follow-up appointment with your primary care doctor or any specialists as instructed.     Discharge Plan:   Diet Plan: Discussed  Activity Level: Discussed  Confirmed Follow up Appointment: Appointment Scheduled  Confirmed Symptoms Management: Discussed  Medication Reconciliation Updated: Yes  Influenza Vaccine Indication: Patient Refuses    I understand that a diet low in cholesterol, fat, and sodium is recommended for good health. Unless I have been given specific instructions below for another diet, I accept this instruction as my diet prescription.   Other diet: heart healthy diet    Special Instructions: None    · Is patient discharged on Warfarin / Coumadin?   No     Blurred Vision  Having blurred vision means that you cannot see things clearly. Your vision may seem fuzzy or out of focus. Blurred vision is a very common symptom of an eye or vision problem. Blurred vision is often a gradual blur that occurs in one eye or both eyes. There are many causes of blurred vision, including cataracts, macular degeneration, and diabetic retinopathy.  Blurred vision can be diagnosed based on your symptoms and a physical exam. Tell your health care provider about any other health problems you have, any recent eye injury, and any prior surgeries. You may need to see a health care provider who specializes in eye problems (ophthalmologist). Your treatment depends on what is causing your blurred vision.   HOME CARE INSTRUCTIONS  · Tell your health care provider about any changes in your blurred vision.  · Do not drive or operate heavy machinery if your vision is blurry.  · Keep all follow-up visits as directed by your health care provider. This is important.  SEEK MEDICAL CARE IF:  · Your symptoms get worse.  · You have new symptoms.  · You have  trouble seeing at night.  · You have trouble seeing up close or far away.  · You have trouble noticing the difference between colors.  SEEK IMMEDIATE MEDICAL CARE IF:  · You have severe eye pain.  · You have a severe headache.  · You have flashing lights in your field of vision.  · You have a sudden change in vision.  · You have a sudden loss of vision.  · You have vision change after an injury.  · You notice drainage coming from your eyes.  · You notice a rash around your eyes.  This information is not intended to replace advice given to you by your health care provider. Make sure you discuss any questions you have with your health care provider.  Document Released: 12/20/2004 Document Revised: 05/03/2016 Document Reviewed: 11/11/2015  [employer] Interactive Patient Education © 2017 [employer]    Depression / Suicide Risk    As you are discharged from this [facility] facility, it is important to learn how to keep safe from harming yourself.    Recognize the warning signs:  · Abrupt changes in personality, positive or negative- including increase in energy   · Giving away possessions  · Change in eating patterns- significant weight changes-  positive or negative  · Change in sleeping patterns- unable to sleep or sleeping all the time   · Unwillingness or inability to communicate  · Depression  · Unusual sadness, discouragement and loneliness  · Talk of wanting to die  · Neglect of personal appearance   · Rebelliousness- reckless behavior  · Withdrawal from people/activities they love  · Confusion- inability to concentrate     If you or a loved one observes any of these behaviors or has concerns about self-harm, here's what you can do:  · Talk about it- your feelings and reasons for harming yourself  · Remove any means that you might use to hurt yourself (examples: pills, rope, extension cords, firearm)  · Get professional help from the community (Mental Health, Substance Abuse, psychological counseling)  · Do  not be alone:Call your Safe Contact- someone whom you trust who will be there for you.  · Call your local CRISIS HOTLINE 739-6905 or 652-766-2817  · Call your local Children's Mobile Crisis Response Team [place] (515) 194-8982 or www.[employer]  · Call the toll free National Suicide Prevention Hotlines   · National Suicide Prevention Lifeline 322-124-EFYY (8498)  · National [employer] Network 800-SUICIDE (734-1831)

## 2022-01-18 NOTE — DISCHARGE PLANNING
Care Transition Team Assessment    Information Source  Orientation : Oriented x 4  Information Given By: Patient    Readmission Evaluation  Is this a readmission?: No      Interdisciplinary Discharge Planning  Does Admitting Nurse Feel This Could be a Complex Discharge?: No  Lives with - Patient's Self Care Capacity: Other (Comments) (Lives with grandparents)  Patient or legal guardian wants to designate a caregiver (see row info): No  Support Systems: Other (Comments), Family Member(s)  Housing / Facility: [address]  Do You Take your Prescribed Medications Regularly: Yes  Able to Return to Previous ADL's: Other  Mobility Issues: No (vision problems)  Prior Services: None  Patient Expects to be Discharged to:: Home  Assistance Needed: Unknown at this Time  Durable Medical Equipment: Other - Specify, Walker (CPAP, wheel chair, shower chair)    Anticipated Discharge Information  Anticipated discharge disposition: Home  Discharge Address: [address] [place] [address]  Discharge Contact Phone Number: [phone]        
PAST SURGICAL HISTORY:  No significant past surgical history